# Patient Record
Sex: FEMALE | Race: WHITE | Employment: UNEMPLOYED | URBAN - METROPOLITAN AREA
[De-identification: names, ages, dates, MRNs, and addresses within clinical notes are randomized per-mention and may not be internally consistent; named-entity substitution may affect disease eponyms.]

---

## 2016-04-07 LAB
ALBUMIN SERPL-MCNC: 4.5 G/DL
ALP BLD-CCNC: 63 U/L
ALT SERPL-CCNC: 17 U/L
ANION GAP SERPL CALCULATED.3IONS-SCNC: NORMAL MMOL/L
AST SERPL-CCNC: 16 U/L
AVERAGE GLUCOSE: NORMAL
BASOPHILS ABSOLUTE: 0.1 /ΜL
BASOPHILS RELATIVE PERCENT: 1.2 %
BILIRUB SERPL-MCNC: 0.3 MG/DL (ref 0.1–1.4)
BUN BLDV-MCNC: 12 MG/DL
CALCIUM SERPL-MCNC: 9.1 MG/DL
CHLORIDE BLD-SCNC: 105 MMOL/L
CHOLESTEROL, TOTAL: 191 MG/DL
CHOLESTEROL/HDL RATIO: NORMAL
CO2: 25 MMOL/L
CREAT SERPL-MCNC: 0.5 MG/DL
EOSINOPHILS ABSOLUTE: 0.1 /ΜL
EOSINOPHILS RELATIVE PERCENT: 1.8 %
GFR CALCULATED: NORMAL
GLUCOSE BLD-MCNC: 113 MG/DL
HBA1C MFR BLD: 5.9 %
HCT VFR BLD CALC: 43.5 % (ref 36–46)
HDLC SERPL-MCNC: 67 MG/DL (ref 35–70)
HEMOGLOBIN: 14.5 G/DL (ref 12–16)
LDL CHOLESTEROL CALCULATED: 106 MG/DL (ref 0–160)
LYMPHOCYTES ABSOLUTE: 1.6 /ΜL
LYMPHOCYTES RELATIVE PERCENT: 22.9 %
MCH RBC QN AUTO: 30.1 PG
MCHC RBC AUTO-ENTMCNC: 33.4 G/DL
MCV RBC AUTO: 90.2 FL
MONOCYTES ABSOLUTE: 0.6 /ΜL
MONOCYTES RELATIVE PERCENT: 8.5 %
NEUTROPHILS ABSOLUTE: 4.6 /ΜL
NEUTROPHILS RELATIVE PERCENT: 65.6 %
PDW BLD-RTO: 14.3 %
PLATELET # BLD: 223 K/ΜL
PMV BLD AUTO: NORMAL FL
POTASSIUM SERPL-SCNC: 4.4 MMOL/L
RBC # BLD: 4.82 10^6/ΜL
SODIUM BLD-SCNC: 149 MMOL/L
TOTAL PROTEIN: 6.6
TRIGL SERPL-MCNC: 90 MG/DL
TSH SERPL DL<=0.05 MIU/L-ACNC: 2.41 UIU/ML
VLDLC SERPL CALC-MCNC: 18 MG/DL
WBC # BLD: 7 10^3/ML

## 2017-09-28 ENCOUNTER — OFFICE VISIT (OUTPATIENT)
Dept: FAMILY MEDICINE CLINIC | Age: 62
End: 2017-09-28

## 2017-09-28 VITALS
HEIGHT: 69 IN | BODY MASS INDEX: 28.53 KG/M2 | SYSTOLIC BLOOD PRESSURE: 136 MMHG | WEIGHT: 192.6 LBS | HEART RATE: 68 BPM | OXYGEN SATURATION: 97 % | RESPIRATION RATE: 16 BRPM | DIASTOLIC BLOOD PRESSURE: 78 MMHG

## 2017-09-28 DIAGNOSIS — F34.1 DYSTHYMIA: Primary | ICD-10-CM

## 2017-09-28 DIAGNOSIS — Z76.89 ENCOUNTER TO ESTABLISH CARE: ICD-10-CM

## 2017-09-28 DIAGNOSIS — Z11.4 SCREENING FOR HIV WITHOUT PRESENCE OF RISK FACTORS: ICD-10-CM

## 2017-09-28 DIAGNOSIS — Z13.1 ENCOUNTER FOR SCREENING FOR DIABETES MELLITUS: ICD-10-CM

## 2017-09-28 DIAGNOSIS — Z23 NEED FOR PROPHYLACTIC VACCINATION AGAINST STREPTOCOCCUS PNEUMONIAE (PNEUMOCOCCUS): ICD-10-CM

## 2017-09-28 DIAGNOSIS — Z13.220 SCREENING FOR HYPERLIPIDEMIA: ICD-10-CM

## 2017-09-28 DIAGNOSIS — Z11.59 NEED FOR HEPATITIS C SCREENING TEST: ICD-10-CM

## 2017-09-28 DIAGNOSIS — M79.645 PAIN OF FINGER OF LEFT HAND: ICD-10-CM

## 2017-09-28 DIAGNOSIS — F17.210 CIGARETTE NICOTINE DEPENDENCE WITHOUT COMPLICATION: ICD-10-CM

## 2017-09-28 DIAGNOSIS — E78.2 MIXED HYPERLIPIDEMIA: ICD-10-CM

## 2017-09-28 DIAGNOSIS — E66.09 NON MORBID OBESITY DUE TO EXCESS CALORIES: ICD-10-CM

## 2017-09-28 DIAGNOSIS — R73.01 ABNORMAL FASTING GLUCOSE: ICD-10-CM

## 2017-09-28 PROCEDURE — 99203 OFFICE O/P NEW LOW 30 MIN: CPT | Performed by: FAMILY MEDICINE

## 2017-09-28 PROCEDURE — 90471 IMMUNIZATION ADMIN: CPT | Performed by: FAMILY MEDICINE

## 2017-09-28 PROCEDURE — 90670 PCV13 VACCINE IM: CPT | Performed by: FAMILY MEDICINE

## 2017-09-28 RX ORDER — VENLAFAXINE 37.5 MG/1
37.5 TABLET ORAL DAILY
Qty: 30 TABLET | Refills: 5 | Status: SHIPPED | OUTPATIENT
Start: 2017-09-28 | End: 2017-10-12 | Stop reason: SDUPTHER

## 2017-09-28 RX ORDER — VENLAFAXINE 37.5 MG/1
37.5 TABLET ORAL DAILY
COMMUNITY
End: 2017-09-28

## 2017-09-28 ASSESSMENT — ENCOUNTER SYMPTOMS
NAUSEA: 0
EYE PAIN: 0
VOICE CHANGE: 0
EYE ITCHING: 0
BACK PAIN: 1
VOMITING: 0
COUGH: 0
DIARRHEA: 0
CONSTIPATION: 0
SHORTNESS OF BREATH: 0
ABDOMINAL PAIN: 0
SORE THROAT: 0

## 2017-09-28 ASSESSMENT — PATIENT HEALTH QUESTIONNAIRE - PHQ9
SUM OF ALL RESPONSES TO PHQ QUESTIONS 1-9: 0
1. LITTLE INTEREST OR PLEASURE IN DOING THINGS: 0
SUM OF ALL RESPONSES TO PHQ9 QUESTIONS 1 & 2: 0
2. FEELING DOWN, DEPRESSED OR HOPELESS: 0

## 2017-09-28 NOTE — PROGRESS NOTES
reports that she drinks alcohol. Controlled Substances Monitoring: Attestation: The Prescription Monitoring Report was requested today but not available. Brielle Huynh MD)  Documentation: No signs of potential drug abuse or diversion identified. (No report matched the search criteria.) Brielle Huynh MD)    Patient Medical History:    Medications: On presentation AND new orders today :  Outpatient Prescriptions Marked as Taking for the 17 encounter (Office Visit) with Brielle Huynh MD   Medication Sig Dispense Refill    neomycin-polymyxin-hydrocortisone (CORTISPORIN) SUSP ophthalmic suspension       venlafaxine (EFFEXOR) 37.5 MG tablet Take 1 tablet by mouth daily 30 tablet 5    simvastatin (ZOCOR) 40 MG tablet Take 40 mg by mouth nightly. Past Medical History:   Diagnosis Date    Hyperlipidemia     Thyroid mass     pt is scheduled for biopsy 2013        Past Surgical History:   Procedure Laterality Date     SECTION  (with BPS)    DILATION AND CURETTAGE OF UTERUS      LAPAROSCOPY      x3- last one 18's        Family History   Problem Relation Age of Onset    Diabetes Mother     Cancer Mother      hx melanoma    Stroke Mother      \"had mini=stroke\"    High Blood Pressure Mother     Cancer Father      lung cancer    Asthma Sister        Social History     Social History    Marital status:      Spouse name: N/A    Number of children: N/A    Years of education: N/A     Occupational History    Not on file.      Social History Main Topics    Smoking status: Current Every Day Smoker     Packs/day: 1.00     Years: 40.00    Smokeless tobacco: Never Used    Alcohol use Yes      Comment: average one time per week    Drug use: No      Comment: \"years ago, 40 yrs ago used cocaine, heroin, LSD\"only tried these once\",marijuana as teenager    Sexual activity: Not on file     Other Topics Concern    Not on file     Social History Narrative    No Normocephalic and atraumatic. Right Ear: Tympanic membrane, external ear and ear canal normal.   Left Ear: Tympanic membrane, external ear and ear canal normal.   Mouth/Throat: Oropharynx is clear and moist and mucous membranes are normal.   Eyes: Conjunctivae and EOM are normal. Pupils are equal, round, and reactive to light. Neck: Neck supple. No spinous process tenderness present. Cardiovascular: Normal rate, regular rhythm, S1 normal, S2 normal and intact distal pulses. No murmur heard. Pulmonary/Chest: Effort normal and breath sounds normal. She has no wheezes. She has no rales. She exhibits no tenderness. Abdominal: Soft. Bowel sounds are normal. She exhibits no mass. There is no tenderness. There is no rebound, no guarding and no CVA tenderness. There is increased abdominal girth. Musculoskeletal: She exhibits no edema or deformity. The left second finger and fifth finger have slight swelling compared to the other side. No erythema. Lymphadenopathy:     She has no cervical adenopathy. Neurological: She is alert and oriented to person, place, and time. She has normal reflexes. No cranial nerve deficit. Skin: Skin is warm, dry and intact. No bruising, no ecchymosis and no rash noted. No erythema. Psychiatric: She has a normal mood and affect. Her speech is normal. Thought content normal.   Vitals reviewed. Today's Point of Care Results: No results found for this visit on 09/28/17. PHQ Scores 9/28/2017   PHQ2 Score 0   PHQ9 Score 0     Interpretation of Total Score Depression Severity: 1-4 = Minimal depression, 5-9 = Mild depression, 10-14 = Moderate depression, 15-19 = Moderately severe depression, 20-27 = Severe depression    Diagnosis, Assessment, Plan, and Associated Orders:     1. Dysthymia:Patient is stable on this dose of Effexor. We discussed the mechanism of action and decided it is helping her at this time.   I spent considerable time with her discussing the possible

## 2017-09-28 NOTE — Clinical Note
Please inform the patient that after I went through her chart from Dr. Yaima Finnegan, I think it would be more efficient for her to get her blood work done before her next visit in 6 weeks. Please schedule a fasting blood draw before that time.   Thank you NGOZI

## 2017-09-28 NOTE — MR AVS SNAPSHOT
After Visit Summary             Chinyere Ellis   2017 8:30 AM   Office Visit    Description:  Female : 1955   Provider:  Pamela Reardon MD   Department:  [place] [address] and Future Appointments         Below is a list of your follow-up and future appointments. This may not be a complete list as you may have made appointments directly with providers that we are not aware of or your providers may have made some for you. Please call your providers to confirm appointments. It is important to keep your appointments. Please bring your current insurance card, photo ID, co-pay, and all medication bottles to your appointment. If self-pay, payment is expected at the time of service. Your To-Do List     Future Appointments Provider Department Dept Phone    2017 9:30 AM Pamela Reardon MD [address] [place] [address] [phone]    Future Orders Complete By Expires    XR FINGER LEFT (MIN 2 VIEWS) [24139 Custom]  2017 (Approximate) 10/18/2018    Follow-Up    Return for recheck mood in 6 week. Information from Your Visit        Department     Name Address Phone Fax    [address] [place] [address] [address]  [address] [place] [address] [phone]      You Were Seen for:         Comments    Dysthymia   [380101]         Vital Signs     Blood Pressure Pulse Respirations Height Weight Oxygen Saturation    136/78 (Site: Right Arm, Position: Sitting, Cuff Size: Large Adult) 68 16 5' 8.5\" (1.74 m) 192 lb 9.6 oz (87.4 kg) 97%    Body Mass Index Smoking Status                28.86 kg/m2 Current Every Day Smoker          Additional Information about your Body Mass Index (BMI)           Your BMI as listed above is considered overweight (25.0-29.9). BMI is an estimate of body fat, calculated from your height and weight.   The higher your BMI, the greater your risk of heart disease, high blood pressure, type 2 diabetes, stroke, gallstones, arthritis, sleep apnea, and certain cancers. BMI is not perfect. It may overestimate body fat in athletes and people who are more muscular. If your body fat is high you can improve your BMI by decreasing your calorie intake and becoming more physically active. Learn more at: [employer]          Instructions    Find Ophthalmologist and let office know if you need a referral for you eye. Today's Medication Changes          These changes are accurate as of: 9/28/17  9:57 AM.  If you have any questions, ask your nurse or doctor. STOP taking these medications           aspirin 325 MG tablet   Stopped by:  Madison Lindsey MD       CALCIUM CITRATE PO   Stopped by:  Madison Lindsey MD       Echinacea 400 MG Caps   Stopped by:  Madison Lindsey MD       [address] by:  Madison Lindsey MD       magnesium gluconate 500 MG tablet   Commonly known as:  Zafar [place] by:  Madison Lindsey MD       naproxen 500 MG tablet   Commonly known as:  NAPROSYN   Stopped by:  Madison Lindsey MD       SUPER B COMPLEX PO   Stopped by:  Madison Lindsey MD       VITAMIN B 12 PO   Stopped by:  Madison Lindsey MD            Where to Get Your Medications      These medications were sent to [facility] [address] [facility], [address] [place] [address] [address] [place] [address] Street [phone]  [facility], [address] [place] [address]     Phone:  [phone]     venlafaxine 37.5 MG tablet               Your Current Medications Are              neomycin-polymyxin-hydrocortisone (CORTISPORIN) SUSP ophthalmic suspension     venlafaxine (EFFEXOR) 37.5 MG tablet Take 1 tablet by mouth daily    simvastatin (ZOCOR) 40 MG tablet Take 40 mg by mouth nightly.       Allergies              Morphine     'years ago, trouble getting blood pressure up after getting Morphine after surgery\"    Tussin [Guaifenesin] \"gave me a odd feeling, like Worthy Liya in [place]"      We Ordered/Performed the following           Pneumococcal conjugate vaccine 13-valent PCV13          Additional Information        Basic Information     Date Of Birth Sex Race Ethnicity Preferred Language    1955 Female White Non-/Non  English      Preventive Care        Date Due    Hepatitis C screening is recommended for all adults regardless of risk factors born between [facility] at least once (lifetime) who have never been tested. 1955    HIV screening is recommended for all people regardless of risk factors  aged 15-65 years at least once (lifetime) who have never been HIV tested. 12/27/1972    Tetanus Combination Vaccine (1 - Tdap) 12/27/1976    Pneumococcal Vaccine - Pneumovax for adults aged 19-64 years with: chronic heart disease, chronic lung disease, diabetes mellitus, alcoholism, chronic liver disease, or cigarette smoking. (1 of 1 - PPSV23) 12/27/1976    Pap Smear 12/27/1978    Cholesterol Screening 12/27/1997    Diabetes Screening 12/27/1997    Colonoscopy 12/27/2007    Mammograms are recommended every 2 years for low/average risk patients aged 48 - 69, and every year for high risk patients per updated national guidelines. However these guidelines can be individualized by your provider. 8/27/2015    Yearly Flu Vaccine (1) 9/1/2017            [employer] Signup           [employer] allows you to send messages to your doctor, view your test results, renew your prescriptions, schedule appointments, view visit notes, and more. How Do I Sign Up? 1. In your Internet browser, go to [email or web address]  2. Click on the Sign Up Now link in the Sign In box. You will see the New Member Sign Up page. 3. Enter your [employer] Access Code exactly as it appears below. You will not need to use this code after youve completed the sign-up process.  If

## 2017-10-11 ENCOUNTER — TELEPHONE (OUTPATIENT)
Dept: FAMILY MEDICINE CLINIC | Age: 62
End: 2017-10-11

## 2017-10-11 DIAGNOSIS — F34.1 DYSTHYMIA: Primary | ICD-10-CM

## 2017-10-12 RX ORDER — VENLAFAXINE HYDROCHLORIDE 37.5 MG/1
37.5 CAPSULE, EXTENDED RELEASE ORAL DAILY
Qty: 30 CAPSULE | Refills: 5 | Status: SHIPPED | OUTPATIENT
Start: 2017-10-12 | End: 2018-04-23 | Stop reason: SDUPTHER

## 2017-10-12 NOTE — TELEPHONE ENCOUNTER
Pt is asking if she needs to take the medication more than once a days madhu the tablets are not extended release please advise

## 2017-10-12 NOTE — TELEPHONE ENCOUNTER
Please inform the patient that I made the switch because of her insurance formulary. If there is a reason she cannot take the tablets, let me know, and I will order the capsules.  Thank you, NGOZI

## 2017-11-03 ENCOUNTER — NURSE ONLY (OUTPATIENT)
Dept: FAMILY MEDICINE CLINIC | Age: 62
End: 2017-11-03

## 2017-11-03 DIAGNOSIS — Z13.1 ENCOUNTER FOR SCREENING FOR DIABETES MELLITUS: ICD-10-CM

## 2017-11-03 DIAGNOSIS — M79.645 PAIN OF FINGER OF LEFT HAND: ICD-10-CM

## 2017-11-03 DIAGNOSIS — R73.01 ABNORMAL FASTING GLUCOSE: ICD-10-CM

## 2017-11-03 DIAGNOSIS — Z13.220 SCREENING FOR HYPERLIPIDEMIA: ICD-10-CM

## 2017-11-03 DIAGNOSIS — Z11.4 SCREENING FOR HIV WITHOUT PRESENCE OF RISK FACTORS: ICD-10-CM

## 2017-11-03 DIAGNOSIS — E66.09 NON MORBID OBESITY DUE TO EXCESS CALORIES: ICD-10-CM

## 2017-11-03 DIAGNOSIS — Z11.59 NEED FOR HEPATITIS C SCREENING TEST: ICD-10-CM

## 2017-11-03 LAB
C-REACTIVE PROTEIN: 0.8 MG/L (ref 0–5.1)
CHOLESTEROL, TOTAL: 215 MG/DL (ref 0–199)
HDLC SERPL-MCNC: 67 MG/DL (ref 40–60)
HEPATITIS C ANTIBODY INTERPRETATION: NORMAL
LDL CHOLESTEROL CALCULATED: 126 MG/DL
SEDIMENTATION RATE, ERYTHROCYTE: 7 MM/HR (ref 0–30)
TRIGL SERPL-MCNC: 108 MG/DL (ref 0–150)
TSH REFLEX: 3.57 UIU/ML (ref 0.27–4.2)
VLDLC SERPL CALC-MCNC: 22 MG/DL

## 2017-11-03 PROCEDURE — 36415 COLL VENOUS BLD VENIPUNCTURE: CPT | Performed by: FAMILY MEDICINE

## 2017-11-06 LAB — HIV-1 AND HIV-2 ANTIBODIES: NORMAL

## 2017-11-13 ENCOUNTER — OFFICE VISIT (OUTPATIENT)
Dept: FAMILY MEDICINE CLINIC | Age: 62
End: 2017-11-13

## 2017-11-13 VITALS — BODY MASS INDEX: 29.7 KG/M2 | DIASTOLIC BLOOD PRESSURE: 86 MMHG | SYSTOLIC BLOOD PRESSURE: 132 MMHG | WEIGHT: 198.2 LBS

## 2017-11-13 DIAGNOSIS — G89.29 CHRONIC RIGHT-SIDED LOW BACK PAIN WITH RIGHT-SIDED SCIATICA: ICD-10-CM

## 2017-11-13 DIAGNOSIS — F34.1 DYSTHYMIA: Primary | ICD-10-CM

## 2017-11-13 DIAGNOSIS — B02.9 HERPES ZOSTER WITHOUT COMPLICATION: ICD-10-CM

## 2017-11-13 DIAGNOSIS — M54.41 CHRONIC RIGHT-SIDED LOW BACK PAIN WITH RIGHT-SIDED SCIATICA: ICD-10-CM

## 2017-11-13 DIAGNOSIS — M72.2 PLANTAR FASCIITIS OF RIGHT FOOT: ICD-10-CM

## 2017-11-13 PROCEDURE — G8427 DOCREV CUR MEDS BY ELIG CLIN: HCPCS | Performed by: FAMILY MEDICINE

## 2017-11-13 PROCEDURE — 3014F SCREEN MAMMO DOC REV: CPT | Performed by: FAMILY MEDICINE

## 2017-11-13 PROCEDURE — 4004F PT TOBACCO SCREEN RCVD TLK: CPT | Performed by: FAMILY MEDICINE

## 2017-11-13 PROCEDURE — G8484 FLU IMMUNIZE NO ADMIN: HCPCS | Performed by: FAMILY MEDICINE

## 2017-11-13 PROCEDURE — G8419 CALC BMI OUT NRM PARAM NOF/U: HCPCS | Performed by: FAMILY MEDICINE

## 2017-11-13 PROCEDURE — 3017F COLORECTAL CA SCREEN DOC REV: CPT | Performed by: FAMILY MEDICINE

## 2017-11-13 PROCEDURE — 99214 OFFICE O/P EST MOD 30 MIN: CPT | Performed by: FAMILY MEDICINE

## 2017-11-13 RX ORDER — VALACYCLOVIR HYDROCHLORIDE 1 G/1
1000 TABLET, FILM COATED ORAL 3 TIMES DAILY
Qty: 21 TABLET | Refills: 1 | Status: SHIPPED | OUTPATIENT
Start: 2017-11-13 | End: 2017-11-20

## 2017-11-13 ASSESSMENT — ENCOUNTER SYMPTOMS
CONSTIPATION: 0
VOMITING: 0
ABDOMINAL PAIN: 0
EYE PAIN: 0
NAUSEA: 0
DIARRHEA: 0
SHORTNESS OF BREATH: 0
VOICE CHANGE: 0
EYE ITCHING: 0
BACK PAIN: 1
COUGH: 0
SORE THROAT: 0

## 2017-11-13 NOTE — PATIENT INSTRUCTIONS
Take Ibuprofen 200 3 tablets 3 times/day for 7-10 days. See if you find the book [employer] in [employer]. Patient Education        Plantar Fasciitis: Exercises  Your Care Instructions  Here are some examples of typical rehabilitation exercises for your condition. Start each exercise slowly. Ease off the exercise if you start to have pain. Your doctor or physical therapist will tell you when you can start these exercises and which ones will work best for you. How to do the exercises  Note: Each exercise should create a pulling feeling but should not cause pain. Towel stretch    1. Sit with your legs extended and knees straight. 2. Place a towel around your foot just under the toes. 3. Hold each end of the towel in each hand, with your hands above your knees. 4. Pull back with the towel so that your foot stretches toward you. 5. Hold the position for at least 15 to 30 seconds. 6. Repeat 2 to 4 times a session, up to 5 sessions a day. Calf stretch    Note: This exercise stretches the muscles at the back of the lower leg (the calf) and the Achilles tendon. Do this exercise 3 or 4 times a day, 5 days a week. 1. Stand facing a wall with your hands on the wall at about eye level. Put the leg you want to stretch about a step behind your other leg. 2. Keeping your back heel on the floor, bend your front knee until you feel a stretch in the back leg. 3. Hold the stretch for 15 to 30 seconds. Repeat 2 to 4 times. Plantar fascia and calf stretch    Note: Stretching the plantar fascia and calf muscles can increase flexibility and decrease heel pain. You can do this exercise several times each day and before and after activity. 1. Stand on a step as shown above. Be sure to hold on to the banister. 2. Slowly let your heels down over the edge of the step as you relax your calf muscles. You should feel a gentle stretch across the bottom of your foot and up the back of your leg to your knee.   3. Hold the stretch

## 2017-11-13 NOTE — PROGRESS NOTES
Linda Zhou  1955  63 y.o. Allergies   Allergen Reactions    Morphine      'years ago, trouble getting blood pressure up after getting Morphine after surgery\"    Tussin [Guaifenesin]      \"gave me a odd feeling, like Carol Butler in [place]"       Chief Complaint   Patient presents with    Depression     still there, back hurting is causing this and no motivation. Sleep often     History of Present Illness  Dr Royce Singh:   Mrs. Ester Stark is a 63 y.o. White woman with Hyperlipidemia, Hypothyroidism, depression, and other medical problems who presents todayTo recheck depression. Depression: The patient states that she is continuing to have lack of motivation and depression. She spoke at length about the situation with her . 2010 started  Effexor for depression. She has tried to go off in the past, but got short with her family and decided to go back on. The patient states that her  is somewhat of a perfectionist and she feels like she is always being critiqued for everything she does. Back pain: The patient states she's had back pain with right sciatica on and off for years. She is seen chiropractors for this in the past.  She also states that her right heel is hurting for more than 3 weeks. It is worsened when she gets up and changing her shoes has not helped her. She takes ibuprofen 3-4 pills once or twice a day for her pain. Rash: The patient has an area on her back which occasionally gets a mildly painful blistering rash. This started again in the exact same place today. Hand pain: Somewhat better, ESR and CRP negative.  in [address] [place] [address] (She had been  previously.)  The patient is a homemaker. Two children: sophomore in [place] [address], and one at [employer]. 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  works as a . The patient  reports that she has been smoking. She has a 40.00 pack-year smoking history. She has never used smokeless tobacco.. change (can't lose weight). Negative for chills, fatigue and fever. 189-190 as low as weight goes   HENT: Negative for congestion, hearing loss, postnasal drip, sore throat, tinnitus and voice change. Eyes: Positive for visual disturbance (glasses/contacts). Negative for pain and itching. Respiratory: Negative for cough and shortness of breath. Cardiovascular: Positive for chest pain (atypical, seen in ED, never heart issue). Negative for palpitations and leg swelling. Gastrointestinal: Negative for abdominal pain, constipation, diarrhea, nausea and vomiting. Endocrine: Negative for cold intolerance, heat intolerance, polydipsia and polyuria. Genitourinary: Positive for dyspareunia ( No intercourse for several years secondary to the patient's uterine prolapse. She states that her gynecologist will not do surgery until she loses some weight.). Negative for dysuria, frequency and urgency. LMP: 2003   Musculoskeletal: Positive for arthralgias (left 2nd finger, pinky, hip pain when she has LBP ) and back pain (arthritis). Skin: Negative for rash. Neurological: Negative for dizziness, seizures, syncope, weakness, numbness and headaches. Hematological: Does not bruise/bleed easily. Psychiatric/Behavioral: Negative for dysphoric mood, sleep disturbance and suicidal ideas. Physical Exam   Nursing note reviewed  /86 (Site: Right Arm, Position: Sitting, Cuff Size: Large Adult)   Wt 198 lb 3.2 oz (89.9 kg)   BMI 29.70 kg/m²   BP Readings from Last 3 Encounters:   11/13/17 132/86   09/28/17 136/78   10/24/15 141/90     Wt Readings from Last 3 Encounters:   11/13/17 198 lb 3.2 oz (89.9 kg)   09/28/17 192 lb 9.6 oz (87.4 kg)   10/24/15 182 lb (82.6 kg)     Body mass index is 29.7 kg/m². Physical Exam   Constitutional: She is oriented to person, place, and time. She appears well-developed and well-nourished. Mrs. Ester Stark is a pleasant woman who is very easy to talk to.   She is

## 2017-11-30 ENCOUNTER — OFFICE VISIT (OUTPATIENT)
Dept: FAMILY MEDICINE CLINIC | Age: 62
End: 2017-11-30

## 2017-11-30 VITALS
OXYGEN SATURATION: 98 % | DIASTOLIC BLOOD PRESSURE: 76 MMHG | WEIGHT: 201 LBS | BODY MASS INDEX: 30.12 KG/M2 | HEART RATE: 69 BPM | SYSTOLIC BLOOD PRESSURE: 130 MMHG

## 2017-11-30 DIAGNOSIS — F17.210 CIGARETTE NICOTINE DEPENDENCE WITHOUT COMPLICATION: ICD-10-CM

## 2017-11-30 DIAGNOSIS — G89.29 CHRONIC RIGHT-SIDED LOW BACK PAIN WITH RIGHT-SIDED SCIATICA: ICD-10-CM

## 2017-11-30 DIAGNOSIS — E66.09 NON MORBID OBESITY DUE TO EXCESS CALORIES: ICD-10-CM

## 2017-11-30 DIAGNOSIS — M54.41 CHRONIC RIGHT-SIDED LOW BACK PAIN WITH RIGHT-SIDED SCIATICA: ICD-10-CM

## 2017-11-30 DIAGNOSIS — Z13.31 POSITIVE DEPRESSION SCREENING: ICD-10-CM

## 2017-11-30 DIAGNOSIS — F34.1 DYSTHYMIA: Primary | ICD-10-CM

## 2017-11-30 PROBLEM — B02.9 HERPES ZOSTER WITHOUT COMPLICATION: Status: ACTIVE | Noted: 2017-11-30

## 2017-11-30 PROCEDURE — G8484 FLU IMMUNIZE NO ADMIN: HCPCS | Performed by: FAMILY MEDICINE

## 2017-11-30 PROCEDURE — 3014F SCREEN MAMMO DOC REV: CPT | Performed by: FAMILY MEDICINE

## 2017-11-30 PROCEDURE — 4004F PT TOBACCO SCREEN RCVD TLK: CPT | Performed by: FAMILY MEDICINE

## 2017-11-30 PROCEDURE — G0444 DEPRESSION SCREEN ANNUAL: HCPCS | Performed by: FAMILY MEDICINE

## 2017-11-30 PROCEDURE — 3017F COLORECTAL CA SCREEN DOC REV: CPT | Performed by: FAMILY MEDICINE

## 2017-11-30 PROCEDURE — G8417 CALC BMI ABV UP PARAM F/U: HCPCS | Performed by: FAMILY MEDICINE

## 2017-11-30 PROCEDURE — G8431 POS CLIN DEPRES SCRN F/U DOC: HCPCS | Performed by: FAMILY MEDICINE

## 2017-11-30 PROCEDURE — G8427 DOCREV CUR MEDS BY ELIG CLIN: HCPCS | Performed by: FAMILY MEDICINE

## 2017-11-30 PROCEDURE — 99214 OFFICE O/P EST MOD 30 MIN: CPT | Performed by: FAMILY MEDICINE

## 2017-11-30 RX ORDER — TIZANIDINE 2 MG/1
TABLET ORAL
Qty: 30 TABLET | Refills: 3 | Status: SHIPPED | OUTPATIENT
Start: 2017-11-30 | End: 2019-03-07 | Stop reason: SDUPTHER

## 2017-11-30 ASSESSMENT — PATIENT HEALTH QUESTIONNAIRE - PHQ9
1. LITTLE INTEREST OR PLEASURE IN DOING THINGS: 1
8. MOVING OR SPEAKING SO SLOWLY THAT OTHER PEOPLE COULD HAVE NOTICED. OR THE OPPOSITE, BEING SO FIGETY OR RESTLESS THAT YOU HAVE BEEN MOVING AROUND A LOT MORE THAN USUAL: 0
6. FEELING BAD ABOUT YOURSELF - OR THAT YOU ARE A FAILURE OR HAVE LET YOURSELF OR YOUR FAMILY DOWN: 1
7. TROUBLE CONCENTRATING ON THINGS, SUCH AS READING THE NEWSPAPER OR WATCHING TELEVISION: 0
SUM OF ALL RESPONSES TO PHQ9 QUESTIONS 1 & 2: 2
10. IF YOU CHECKED OFF ANY PROBLEMS, HOW DIFFICULT HAVE THESE PROBLEMS MADE IT FOR YOU TO DO YOUR WORK, TAKE CARE OF THINGS AT HOME, OR GET ALONG WITH OTHER PEOPLE: 1
SUM OF ALL RESPONSES TO PHQ QUESTIONS 1-9: 6
9. THOUGHTS THAT YOU WOULD BE BETTER OFF DEAD, OR OF HURTING YOURSELF: 0
2. FEELING DOWN, DEPRESSED OR HOPELESS: 1
4. FEELING TIRED OR HAVING LITTLE ENERGY: 1
3. TROUBLE FALLING OR STAYING ASLEEP: 1
5. POOR APPETITE OR OVEREATING: 1

## 2017-11-30 ASSESSMENT — ENCOUNTER SYMPTOMS
SORE THROAT: 0
CONSTIPATION: 0
VOICE CHANGE: 0
EYE PAIN: 0
VOMITING: 0
ABDOMINAL PAIN: 0
EYE ITCHING: 0
COUGH: 0
DIARRHEA: 0
BACK PAIN: 1
NAUSEA: 0
SHORTNESS OF BREATH: 0

## 2017-11-30 NOTE — PROGRESS NOTES
smokeless tobacco..  The patient also  reports that she drinks alcohol. Controlled Substances Monitoring:      Patient Medical History:    Medications: On presentation AND new orders today :  Outpatient Prescriptions Marked as Taking for the 17 encounter (Office Visit) with Mallory Solis MD   Medication Sig Dispense Refill    tiZANidine (ZANAFLEX) 2 MG tablet Take 1/4, 1/2, or 1 tablet by mouth before sleep as needed for muscle relaxation or sleep. 30 tablet 3       Past Medical History:   Diagnosis Date    Hyperlipidemia     Thyroid mass     pt is scheduled for biopsy 2013        Past Surgical History:   Procedure Laterality Date     SECTION  (with BPS)    DILATION AND CURETTAGE OF UTERUS      LAPAROSCOPY      x3- last one 18's        Family History   Problem Relation Age of Onset    Diabetes Mother     Cancer Mother      hx melanoma    Stroke Mother      \"had mini=stroke\"    High Blood Pressure Mother     Cancer Father      lung cancer    Asthma Sister        Social History     Social History    Marital status:      Spouse name: N/A    Number of children: N/A    Years of education: N/A     Occupational History    Not on file. Social History Main Topics    Smoking status: Current Every Day Smoker     Packs/day: 1.00     Years: 40.00    Smokeless tobacco: Never Used    Alcohol use Yes      Comment: average one time per week    Drug use: No      Comment: \"years ago, 40 yrs ago used cocaine, heroin, LSD\"only tried these once\",marijuana as teenager    Sexual activity: Yes      Comment:       Other Topics Concern    Not on file     Social History Narrative    No narrative on file       Review of Systems   Constitutional: Positive for unexpected weight change (can't lose weight). Negative for chills, fatigue and fever.         189-190 as low as weight goes   HENT: Negative for congestion, hearing loss, postnasal drip, sore throat, tinnitus and voice change. Eyes: Positive for visual disturbance (glasses/contacts). Negative for pain and itching. Respiratory: Negative for cough and shortness of breath. Cardiovascular: Positive for chest pain (atypical, seen in ED, never heart issue). Negative for palpitations and leg swelling. Gastrointestinal: Negative for abdominal pain, constipation, diarrhea, nausea and vomiting. Endocrine: Negative for cold intolerance, heat intolerance, polydipsia and polyuria. Genitourinary: Positive for dyspareunia ( No intercourse for several years secondary to the patient's uterine prolapse. She states that her gynecologist will not do surgery until she loses some weight.). Negative for dysuria, frequency and urgency. LMP: 2003   Musculoskeletal: Positive for arthralgias (left 2nd finger, pinky, hip pain when she has LBP ) and back pain (arthritis). Skin: Negative for rash. Neurological: Negative for dizziness, seizures, syncope, weakness, numbness and headaches. Hematological: Does not bruise/bleed easily. Psychiatric/Behavioral: Positive for sleep disturbance (never feels rested, not morning person. ). Negative for dysphoric mood and suicidal ideas. Physical Exam   Nursing note reviewed  /76 (Site: Right Arm, Position: Sitting, Cuff Size: Large Adult)   Pulse 69   Wt 201 lb (91.2 kg)   SpO2 98%   BMI 30.12 kg/m²   BP Readings from Last 3 Encounters:   11/30/17 130/76   11/13/17 132/86   09/28/17 136/78     Wt Readings from Last 3 Encounters:   11/30/17 201 lb (91.2 kg)   11/13/17 198 lb 3.2 oz (89.9 kg)   09/28/17 192 lb 9.6 oz (87.4 kg)     Body mass index is 30.12 kg/m². Physical Exam   Constitutional: She is oriented to person, place, and time. She appears well-developed and well-nourished. Mrs. Nelida Cervantes is a pleasant woman who is very easy to talk to. She is overweight. She seems to be in a better mood than she was on previous occasions.    HENT:   Head: Normocephalic and atraumatic. Mouth/Throat: Oropharynx is clear and moist.   Cardiovascular: Normal rate, regular rhythm and normal heart sounds. Pulmonary/Chest: Breath sounds normal.   Abdominal: Soft. Bowel sounds are normal.   Musculoskeletal: She exhibits tenderness (lower back). She exhibits no edema. Neurological: She is alert and oriented to person, place, and time. Skin: Skin is warm and dry. Psychiatric: She has a normal mood and affect. Thought content normal.   The patient was very honest about her feelings surrounding her marriage and relationship with her . Vitals reviewed. Labs reviewed with patient. Today's Point of Care Results: No results found for this visit on 11/30/17. PHQ Scores 11/30/2017 9/28/2017   PHQ2 Score 2 0   PHQ9 Score 6 0     Interpretation of Total Score Depression Severity: 1-4 = Minimal depression, 5-9 = Mild depression, 10-14 = Moderate depression, 15-19 = Moderately severe depression, 20-27 = Severe depression    Diagnosis, Assessment, Plan, and Associated Orders:     1. Dysthymia:The patient is doing better having read some literature and had some good talks with her family over the Thanksgiving weekend. She still does not sleep very well or feel rested in the mornings. 2. Chronic right-sided low back pain with right-sided sciatica:I explained to the patient that the alpha 2 agonist Zanaflex can help her back pain and help her have more effective sleep. She verbalized understanding and willingness to try this medication. tiZANidine (ZANAFLEX) 2 MG tablet   3. Positive depression screening: On the basis of positive PHQ-9 screening (PHQ-9 Total Score: 6), the following plan was implemented: medication prescribed: Effexor- 37.5- patient will call for any significant medication side effects or worsening symptoms of depression. Patient has been on this medication for a significant amount of time. Patient will follow-up in 5 week(s) with PCP.

## 2018-04-23 DIAGNOSIS — F34.1 DYSTHYMIA: ICD-10-CM

## 2018-04-23 RX ORDER — VENLAFAXINE HYDROCHLORIDE 37.5 MG/1
37.5 CAPSULE, EXTENDED RELEASE ORAL DAILY
Qty: 30 CAPSULE | Refills: 5 | Status: CANCELLED | OUTPATIENT
Start: 2018-04-23

## 2018-04-24 RX ORDER — VENLAFAXINE HYDROCHLORIDE 37.5 MG/1
37.5 CAPSULE, EXTENDED RELEASE ORAL DAILY
Qty: 30 CAPSULE | Refills: 5 | Status: SHIPPED | OUTPATIENT
Start: 2018-04-24 | End: 2018-11-29

## 2018-05-25 ENCOUNTER — TELEPHONE (OUTPATIENT)
Dept: FAMILY MEDICINE CLINIC | Age: 63
End: 2018-05-25

## 2018-07-19 ENCOUNTER — OFFICE VISIT (OUTPATIENT)
Dept: FAMILY MEDICINE CLINIC | Age: 63
End: 2018-07-19

## 2018-07-19 VITALS
RESPIRATION RATE: 16 BRPM | DIASTOLIC BLOOD PRESSURE: 68 MMHG | WEIGHT: 195.8 LBS | HEART RATE: 71 BPM | BODY MASS INDEX: 29 KG/M2 | HEIGHT: 69 IN | SYSTOLIC BLOOD PRESSURE: 116 MMHG | OXYGEN SATURATION: 97 %

## 2018-07-19 DIAGNOSIS — S53.124A POSTERIOR DISLOCATION OF RIGHT ULNOHUMERAL JOINT, INITIAL ENCOUNTER: ICD-10-CM

## 2018-07-19 DIAGNOSIS — E66.09 NON MORBID OBESITY DUE TO EXCESS CALORIES: ICD-10-CM

## 2018-07-19 DIAGNOSIS — E04.2 MULTIPLE THYROID NODULES: ICD-10-CM

## 2018-07-19 DIAGNOSIS — Z87.891 PERSONAL HISTORY OF TOBACCO USE: ICD-10-CM

## 2018-07-19 DIAGNOSIS — Z12.2 ENCOUNTER FOR SCREENING FOR CANCER OF RESPIRATORY ORGANS: ICD-10-CM

## 2018-07-19 DIAGNOSIS — F34.1 DYSTHYMIA: Primary | ICD-10-CM

## 2018-07-19 DIAGNOSIS — F17.210 CIGARETTE NICOTINE DEPENDENCE WITHOUT COMPLICATION: ICD-10-CM

## 2018-07-19 LAB — TSH REFLEX: 2.4 UIU/ML (ref 0.27–4.2)

## 2018-07-19 PROCEDURE — G8427 DOCREV CUR MEDS BY ELIG CLIN: HCPCS | Performed by: FAMILY MEDICINE

## 2018-07-19 PROCEDURE — 4004F PT TOBACCO SCREEN RCVD TLK: CPT | Performed by: FAMILY MEDICINE

## 2018-07-19 PROCEDURE — G8417 CALC BMI ABV UP PARAM F/U: HCPCS | Performed by: FAMILY MEDICINE

## 2018-07-19 PROCEDURE — 36415 COLL VENOUS BLD VENIPUNCTURE: CPT | Performed by: FAMILY MEDICINE

## 2018-07-19 PROCEDURE — 99214 OFFICE O/P EST MOD 30 MIN: CPT | Performed by: FAMILY MEDICINE

## 2018-07-19 PROCEDURE — 3017F COLORECTAL CA SCREEN DOC REV: CPT | Performed by: FAMILY MEDICINE

## 2018-07-19 PROCEDURE — G0296 VISIT TO DETERM LDCT ELIG: HCPCS | Performed by: FAMILY MEDICINE

## 2018-07-19 NOTE — PROGRESS NOTES
vacation. It was ok, but they couldn't do anything because of her dislocated arm. Due for Mammogram, Colonoscopy, Started to schedule some stuff last year, then stopped. The patient  reports that she has been smoking. She has a 40.00 pack-year smoking history. She has never used smokeless tobacco.. The patient also  reports that she drinks alcohol. Allergies   Allergen Reactions    Morphine      'years ago, trouble getting blood pressure up after getting Morphine after surgery\"    Tussin [Guaifenesin]      \"gave me a odd feeling, like Vida Mendes in [place]"       Medications: On presentation AND new orders today :  Outpatient Prescriptions Marked as Taking for the 7/19/18 encounter (Office Visit) with Maureen Leon MD   Medication Sig Dispense Refill    venlafaxine (EFFEXOR XR) 37.5 MG extended release capsule Take 1 capsule by mouth daily 30 capsule 5       Past Medical History:   Diagnosis Date    Hyperlipidemia     Thyroid mass     pt is scheduled for biopsy 7/31/2013        Review of Systems   Constitutional: Negative for chills and fever. HENT: Negative for congestion, sinus pain, sinus pressure and sore throat. Right ear has clogging see HPI. Respiratory: Negative for cough, shortness of breath and wheezing. Cardiovascular: Negative for chest pain, palpitations and leg swelling. Gastrointestinal: Negative for abdominal pain, constipation, diarrhea, nausea and vomiting. Genitourinary: Negative for dysuria, frequency and urgency. Musculoskeletal: Positive for back pain. Planter fasciitis and intermittent back pain. Has trouble sitting. Skin: Negative for rash. Neurological: Negative for dizziness, seizures and syncope. Psychiatric/Behavioral: Positive for dysphoric mood (controlled with medications). Negative for sleep disturbance. The patient is not nervous/anxious.          Objective:      /68 (Site: Left Arm, Position: Sitting, Cuff Size: Large Adult) Pulse 71   Resp 16    5' 8.5\" (1.74 m)   Wt 195 lb 12.8 oz (88.8 kg)   SpO2 97%   BMI 29.34 kg/m²    BP Readings from Last 3 Encounters:   07/19/18 116/68   05/24/18 (!) 99/57   11/30/17 130/76     Wt Readings from Last 3 Encounters:   07/19/18 195 lb 12.8 oz (88.8 kg)   11/30/17 201 lb (91.2 kg)   11/13/17 198 lb 3.2 oz (89.9 kg)     Body mass index is 29.34 kg/m². Physical Exam   Constitutional: She is oriented to person, place, and time. She appears well-developed and well-nourished. Patient is overweight. HENT:   Head: Normocephalic and atraumatic. Mouth/Throat: Oropharynx is clear and moist.   Cardiovascular: Normal rate, regular rhythm and normal heart sounds. No murmur heard. Pulmonary/Chest: Breath sounds normal. She has no wheezes. She has no rales. Abdominal: Soft. Bowel sounds are normal. There is no tenderness. There is no rebound. There is abdominal obesity. Musculoskeletal: She exhibits no edema. Tenderness: There is tenderness on the inner aspect of both thighs with palpation over the tendinous tissue near the knees. No erythema no edema of the feet. Patient is unable to completely extend her right elbow. She is not wearing a brace or bandage. Neurological: She is alert and oriented to person, place, and time. Skin: Skin is warm and dry. Psychiatric: She has a normal mood and affect. Thought content normal.   Vitals reviewed. TSH on 7/19/2018:  2.40 normal.    Today's Point of Care Results: No results found for this visit on 07/19/18. PHQ Scores 11/30/2017 9/28/2017   PHQ2 Score 2 0   PHQ9 Score 6 0     Interpretation of Total Score Depression Severity: 1-4 = Minimal depression, 5-9 = Mild depression, 10-14 = Moderate depression, 15-19 = Moderately severe depression, 20-27 = Severe depression       Assessment / Plan:       Diagnosis Orders   1. Dysthymia  TSH with Reflex   2. Posterior dislocation of right ulnohumeral joint, initial encounter     3.  Non

## 2018-07-27 PROBLEM — E04.2 MULTIPLE THYROID NODULES: Status: ACTIVE | Noted: 2018-07-27

## 2018-07-27 ASSESSMENT — ENCOUNTER SYMPTOMS
COUGH: 0
SINUS PAIN: 0
SINUS PRESSURE: 0
BACK PAIN: 1
SHORTNESS OF BREATH: 0
DIARRHEA: 0
WHEEZING: 0
CONSTIPATION: 0
VOMITING: 0
ABDOMINAL PAIN: 0
NAUSEA: 0
SORE THROAT: 0

## 2018-08-01 ENCOUNTER — TELEPHONE (OUTPATIENT)
Dept: FAMILY MEDICINE CLINIC | Age: 63
End: 2018-08-01

## 2018-08-21 ENCOUNTER — HOSPITAL ENCOUNTER (OUTPATIENT)
Dept: ULTRASOUND IMAGING | Age: 63
Discharge: HOME | End: 2018-08-21
Attending: FAMILY MEDICINE | Admitting: FAMILY MEDICINE

## 2018-08-21 ENCOUNTER — TELEPHONE (OUTPATIENT)
Dept: FAMILY MEDICINE CLINIC | Age: 63
End: 2018-08-21

## 2018-08-21 DIAGNOSIS — E04.1 THYROID NODULE: Primary | ICD-10-CM

## 2018-08-21 DIAGNOSIS — E04.2 NONTOXIC MULTINODULAR GOITER: ICD-10-CM

## 2018-08-21 DIAGNOSIS — Z12.2 ENCOUNTER FOR SCREENING FOR CANCER OF RESPIRATORY ORGANS: ICD-10-CM

## 2018-08-21 DIAGNOSIS — E04.2 MULTIPLE THYROID NODULES: ICD-10-CM

## 2018-08-21 DIAGNOSIS — Z12.31 VISIT FOR SCREENING MAMMOGRAM: ICD-10-CM

## 2018-08-21 NOTE — TELEPHONE ENCOUNTER
Please inform patient that her screening CT scan for lung cancer showed no lung nodules; however, it did show the nodule in her thyroid. She should have a repeat screening screening lung CT scan in one year. Her mammogram was negative for any problems. The ultrasound of the thyroid showed that the nodule on the left side has grown and should have fine-needle aspiration. The only difference is that it is bigger, there is no change in the way it looks. I have ordered the fine-needle aspiration to be done at the [facility]) as she has had before. Central Scheduling should call her to schedule the appointment.    Also, Please fax order to central scheduling, Thank you, NGOZI

## 2018-10-21 ENCOUNTER — PATIENT MESSAGE (OUTPATIENT)
Dept: FAMILY MEDICINE CLINIC | Age: 63
End: 2018-10-21

## 2018-10-21 DIAGNOSIS — F34.1 DYSTHYMIA: Primary | ICD-10-CM

## 2018-10-22 NOTE — TELEPHONE ENCOUNTER
From: Bebo Ratliff  To: Yessi Chery MD  Sent: 10/21/2018 3:45 PM EDT  Subject: Prescription Question    I will run out of my current prescription of Effexor XR in about a week. As per previous conversation with Dr. Magdalena Lala, could she please call in a new prescription ([facility]) of Pristiq generic desvenlafaxine succinate er 25 mg with a 90 day supply. I have made an appointment but cannot get in until late November. Thank you.  Moy Catching

## 2018-10-23 RX ORDER — DESVENLAFAXINE 25 MG/1
25 TABLET, EXTENDED RELEASE ORAL DAILY
Qty: 90 TABLET | Refills: 1 | Status: SHIPPED | OUTPATIENT
Start: 2018-10-23 | End: 2019-01-07 | Stop reason: SDUPTHER

## 2018-11-29 ENCOUNTER — OFFICE VISIT (OUTPATIENT)
Dept: FAMILY MEDICINE CLINIC | Age: 63
End: 2018-11-29
Payer: COMMERCIAL

## 2018-11-29 VITALS
HEIGHT: 69 IN | SYSTOLIC BLOOD PRESSURE: 124 MMHG | OXYGEN SATURATION: 93 % | BODY MASS INDEX: 26.69 KG/M2 | WEIGHT: 180.2 LBS | RESPIRATION RATE: 12 BRPM | DIASTOLIC BLOOD PRESSURE: 70 MMHG | HEART RATE: 69 BPM

## 2018-11-29 DIAGNOSIS — G44.219 EPISODIC TENSION-TYPE HEADACHE, NOT INTRACTABLE: ICD-10-CM

## 2018-11-29 DIAGNOSIS — Z78.0 MENOPAUSE: ICD-10-CM

## 2018-11-29 DIAGNOSIS — F34.1 DYSTHYMIA: Primary | ICD-10-CM

## 2018-11-29 DIAGNOSIS — R73.03 PREDIABETES: ICD-10-CM

## 2018-11-29 DIAGNOSIS — Z87.81 HISTORY OF ARM FRACTURE: ICD-10-CM

## 2018-11-29 DIAGNOSIS — E04.2 NONTOXIC MULTINODULAR GOITER: ICD-10-CM

## 2018-11-29 DIAGNOSIS — Z13.820 SCREENING FOR OSTEOPOROSIS: ICD-10-CM

## 2018-11-29 DIAGNOSIS — F17.210 CIGARETTE NICOTINE DEPENDENCE WITHOUT COMPLICATION: ICD-10-CM

## 2018-11-29 DIAGNOSIS — L82.1 SEBORRHEIC KERATOSIS: ICD-10-CM

## 2018-11-29 LAB — HBA1C MFR BLD: 5.8 %

## 2018-11-29 PROCEDURE — G8427 DOCREV CUR MEDS BY ELIG CLIN: HCPCS | Performed by: FAMILY MEDICINE

## 2018-11-29 PROCEDURE — G8417 CALC BMI ABV UP PARAM F/U: HCPCS | Performed by: FAMILY MEDICINE

## 2018-11-29 PROCEDURE — 99214 OFFICE O/P EST MOD 30 MIN: CPT | Performed by: FAMILY MEDICINE

## 2018-11-29 PROCEDURE — G8484 FLU IMMUNIZE NO ADMIN: HCPCS | Performed by: FAMILY MEDICINE

## 2018-11-29 PROCEDURE — 3017F COLORECTAL CA SCREEN DOC REV: CPT | Performed by: FAMILY MEDICINE

## 2018-11-29 PROCEDURE — 83036 HEMOGLOBIN GLYCOSYLATED A1C: CPT | Performed by: FAMILY MEDICINE

## 2018-11-29 PROCEDURE — 4004F PT TOBACCO SCREEN RCVD TLK: CPT | Performed by: FAMILY MEDICINE

## 2018-11-29 RX ORDER — MAGNESIUM 30 MG
30 TABLET ORAL DAILY
COMMUNITY

## 2018-11-29 NOTE — PROGRESS NOTES
heart sounds. Pulmonary/Chest: Breath sounds normal.   Abdominal: Soft. Bowel sounds are normal.   Musculoskeletal: Limbs intact X 4. Legs are free of edema. There is tenderness in the low back on the right. No swelling noted in the knees. Skin: Skin is warm and dry. There is a approximately 1 x 1 cm pigmented lesion on the right side of the patient's neck posteriorly. This has all the characteristics of a seborrheic keratosis with this stuck on appearance. I reassured the patient about the benign nature of this lesion. Psychiatric: Patient has a normal mood and affect. Thought content normal.   Vitals reviewed. Lab Results   Component Value Date     04/07/2016    K 4.4 04/07/2016     04/07/2016    CO2 25 04/07/2016    BUN 12 04/07/2016    CREATININE 0.5 04/07/2016    GLUCOSE 113 04/07/2016    CALCIUM 9.1 04/07/2016    PROT 6.7 07/16/2013    LABALBU 4.5 04/07/2016    BILITOT 0.3 04/07/2016    ALKPHOS 63 04/07/2016    AST 16 04/07/2016    ALT 17 04/07/2016    LABGLOM >60 07/16/2013    GFRAA >60 07/16/2013       No results for input(s): WBC, HGB, HCT, MCV, PLT in the last 720 hours. Lab Results   Component Value Date    CHOL 215 (H) 11/03/2017    CHOL 191 04/07/2016     Lab Results   Component Value Date    TRIG 108 11/03/2017    TRIG 90 04/07/2016     Lab Results   Component Value Date    HDL 67 (H) 11/03/2017    HDL 67 04/07/2016     Lab Results   Component Value Date    LDLCALC 126 (H) 11/03/2017    LDLCALC 106 04/07/2016     Lab Results   Component Value Date    LABVLDL 22 11/03/2017    VLDL 18 04/07/2016     No results found for: [facility]  Lab Results   Component Value Date    LABA1C 5.8 11/29/2018     No results found for: EAG  Results for Mario Grace (MRN [id number]) as of 12/9/2018 18:05   Ref.  Range 7/19/2018 12:13   TSH Latest Ref Range: 0.27 - 4.20 uIU/mL 2.40     Today's Point of Care Results:   Results for POC orders placed in visit on 11/29/18   POCT glycosylated hemoglobin (Hb A1C)   Result Value Ref Range    Hemoglobin A1C 5.8 %     PHQ Scores 11/30/2017 9/28/2017   PHQ2 Score 2 0   PHQ9 Score 6 0     Interpretation of Total Score Depression Severity: 1-4 = Minimal depression, 5-9 = Mild depression, 10-14 = Moderate depression, 15-19 = Moderately severe depression, 20-27 = Severedepression       Assessment / Plan:       Diagnosis Orders   1. Dysthymia     2. Nontoxic multinodular goiter     3. Seborrheic keratosis     4. Cigarette nicotine dependence without complication     5. History of arm fracture      6. Menopause       DEXA BONE DENSITY AXIAL SKELETON   7. Screening for osteoporosis       DEXA BONE DENSITY AXIAL SKELETON   8. Prediabetes  POCT glycosylated hemoglobin (Hb A1C)      Discussion/Plan:  Depression: This patient tends to take care of everybody else except herself. I encouraged her to: Take the muscle relaxant. Get more sleep. Try wearing retainer for jaw. And decrease her smoking. I spent more than 15 minutes of the more than 25 minute interview in counseling the patient about her various social issues. Goiter: The patient states that she was never called for the fine-needle aspiration of her thyroid nodule. We will check into this so that it can be done. Reassured patient about seborrheic keratosis. Prediabetes: Patient congratulated on her weight loss and continuing diet plan. Restrictions: none, patient not working outside the home at this time. Will get DEXA scan secondary to patient's fracture and menopausal state. Gwenith First received counseling on the following healthy behaviors: exercise and tobacco cessation   Discussed use, benefit, and side effects of prescribedmedications as prescribed above. Patient instructed to notify our office if side effects develop from the medications. Barriers to medication compliance addressed. All patient questions answered. Pt voiced understanding.      Return for recheck headache in one

## 2018-12-09 ENCOUNTER — TELEPHONE (OUTPATIENT)
Dept: FAMILY MEDICINE CLINIC | Age: 63
End: 2018-12-09

## 2018-12-09 PROBLEM — B02.9 HERPES ZOSTER WITHOUT COMPLICATION: Status: RESOLVED | Noted: 2017-11-30 | Resolved: 2018-12-09

## 2018-12-28 ENCOUNTER — HOSPITAL ENCOUNTER (OUTPATIENT)
Dept: WOMENS IMAGING | Age: 63
Discharge: HOME | End: 2018-12-28
Payer: COMMERCIAL

## 2018-12-28 DIAGNOSIS — Z87.81 HISTORY OF ARM FRACTURE: ICD-10-CM

## 2018-12-28 DIAGNOSIS — Z13.820 SCREENING FOR OSTEOPOROSIS: ICD-10-CM

## 2018-12-28 DIAGNOSIS — Z78.0 MENOPAUSE: ICD-10-CM

## 2018-12-28 PROCEDURE — 77080 DXA BONE DENSITY AXIAL: CPT

## 2019-01-02 ENCOUNTER — HOSPITAL ENCOUNTER (OUTPATIENT)
Dept: INTERVENTIONAL RADIOLOGY/VASCULAR | Age: 64
Discharge: HOME | End: 2019-01-02
Payer: COMMERCIAL

## 2019-01-02 VITALS
WEIGHT: 175 LBS | HEIGHT: 67 IN | OXYGEN SATURATION: 97 % | HEART RATE: 67 BPM | SYSTOLIC BLOOD PRESSURE: 164 MMHG | RESPIRATION RATE: 16 BRPM | BODY MASS INDEX: 27.47 KG/M2 | TEMPERATURE: 97.7 F | DIASTOLIC BLOOD PRESSURE: 84 MMHG

## 2019-01-02 DIAGNOSIS — E04.1 THYROID NODULE: ICD-10-CM

## 2019-01-02 LAB
APTT: 27.1 SECONDS (ref 21.2–33)
HCT VFR BLD CALC: 48.2 % (ref 37–47)
HEMOGLOBIN: 15.3 GM/DL (ref 12.5–16)
INR BLD: 1.04 INDEX
MCH RBC QN AUTO: 29.9 PG (ref 27–31)
MCHC RBC AUTO-ENTMCNC: 31.7 % (ref 32–36)
MCV RBC AUTO: 94.1 FL (ref 78–100)
PDW BLD-RTO: 14 % (ref 11.7–14.9)
PLATELET # BLD: 252 K/CU MM (ref 140–440)
PMV BLD AUTO: 9.6 FL (ref 7.5–11.1)
PROTHROMBIN TIME: 11.8 SECONDS (ref 9.12–12.5)
RBC # BLD: 5.12 M/CU MM (ref 4.2–5.4)
WBC # BLD: 7.9 K/CU MM (ref 4–10.5)

## 2019-01-02 PROCEDURE — 88305 TISSUE EXAM BY PATHOLOGIST: CPT

## 2019-01-02 PROCEDURE — 85730 THROMBOPLASTIN TIME PARTIAL: CPT

## 2019-01-02 PROCEDURE — 2709999900 HC NON-CHARGEABLE SUPPLY

## 2019-01-02 PROCEDURE — 85610 PROTHROMBIN TIME: CPT

## 2019-01-02 PROCEDURE — 60100 BIOPSY OF THYROID: CPT

## 2019-01-02 PROCEDURE — 7100000010 HC PHASE II RECOVERY - FIRST 15 MIN

## 2019-01-02 PROCEDURE — 76942 ECHO GUIDE FOR BIOPSY: CPT

## 2019-01-02 PROCEDURE — 85027 COMPLETE CBC AUTOMATED: CPT

## 2019-01-02 ASSESSMENT — PAIN - FUNCTIONAL ASSESSMENT: PAIN_FUNCTIONAL_ASSESSMENT: 0-10

## 2019-01-02 ASSESSMENT — PAIN SCALES - GENERAL: PAINLEVEL_OUTOF10: 1

## 2019-01-07 ENCOUNTER — OFFICE VISIT (OUTPATIENT)
Dept: FAMILY MEDICINE CLINIC | Age: 64
End: 2019-01-07
Payer: COMMERCIAL

## 2019-01-07 VITALS
OXYGEN SATURATION: 95 % | SYSTOLIC BLOOD PRESSURE: 126 MMHG | HEART RATE: 84 BPM | WEIGHT: 181.8 LBS | HEIGHT: 67 IN | RESPIRATION RATE: 14 BRPM | BODY MASS INDEX: 28.53 KG/M2 | DIASTOLIC BLOOD PRESSURE: 70 MMHG

## 2019-01-07 DIAGNOSIS — F51.02 ADJUSTMENT INSOMNIA: ICD-10-CM

## 2019-01-07 DIAGNOSIS — G44.219 EPISODIC TENSION-TYPE HEADACHE, NOT INTRACTABLE: ICD-10-CM

## 2019-01-07 DIAGNOSIS — F34.1 DYSTHYMIA: Primary | ICD-10-CM

## 2019-01-07 DIAGNOSIS — M85.89 OSTEOPENIA OF MULTIPLE SITES: ICD-10-CM

## 2019-01-07 DIAGNOSIS — E04.1 THYROID NODULE: ICD-10-CM

## 2019-01-07 DIAGNOSIS — F17.210 CIGARETTE NICOTINE DEPENDENCE WITHOUT COMPLICATION: ICD-10-CM

## 2019-01-07 DIAGNOSIS — E04.2 NONTOXIC MULTINODULAR GOITER: ICD-10-CM

## 2019-01-07 PROCEDURE — G8427 DOCREV CUR MEDS BY ELIG CLIN: HCPCS | Performed by: FAMILY MEDICINE

## 2019-01-07 PROCEDURE — 3017F COLORECTAL CA SCREEN DOC REV: CPT | Performed by: FAMILY MEDICINE

## 2019-01-07 PROCEDURE — G8484 FLU IMMUNIZE NO ADMIN: HCPCS | Performed by: FAMILY MEDICINE

## 2019-01-07 PROCEDURE — G8417 CALC BMI ABV UP PARAM F/U: HCPCS | Performed by: FAMILY MEDICINE

## 2019-01-07 PROCEDURE — 4004F PT TOBACCO SCREEN RCVD TLK: CPT | Performed by: FAMILY MEDICINE

## 2019-01-07 PROCEDURE — 99214 OFFICE O/P EST MOD 30 MIN: CPT | Performed by: FAMILY MEDICINE

## 2019-01-07 RX ORDER — DESVENLAFAXINE 50 MG/1
50 TABLET, EXTENDED RELEASE ORAL DAILY
Qty: 90 TABLET | Refills: 1 | Status: SHIPPED | OUTPATIENT
Start: 2019-01-07

## 2019-01-07 RX ORDER — TRAZODONE HYDROCHLORIDE 50 MG/1
25-50 TABLET ORAL NIGHTLY
Qty: 30 TABLET | Refills: 2 | Status: SHIPPED | OUTPATIENT
Start: 2019-01-07

## 2019-01-08 PROBLEM — F51.02 ADJUSTMENT INSOMNIA: Status: ACTIVE | Noted: 2019-01-08

## 2019-03-07 DIAGNOSIS — M54.41 CHRONIC RIGHT-SIDED LOW BACK PAIN WITH RIGHT-SIDED SCIATICA: ICD-10-CM

## 2019-03-07 DIAGNOSIS — G89.29 CHRONIC RIGHT-SIDED LOW BACK PAIN WITH RIGHT-SIDED SCIATICA: ICD-10-CM

## 2019-03-07 RX ORDER — TIZANIDINE 2 MG/1
TABLET ORAL
Qty: 30 TABLET | Refills: 1 | Status: SHIPPED | OUTPATIENT
Start: 2019-03-07

## 2019-03-11 ENCOUNTER — TELEPHONE (OUTPATIENT)
Dept: FAMILY MEDICINE CLINIC | Age: 64
End: 2019-03-11

## 2023-10-09 ENCOUNTER — OFFICE VISIT (OUTPATIENT)
Dept: SURGERY | Age: 68
End: 2023-10-09
Payer: COMMERCIAL

## 2023-10-09 VITALS
WEIGHT: 159.9 LBS | SYSTOLIC BLOOD PRESSURE: 140 MMHG | BODY MASS INDEX: 25.1 KG/M2 | HEIGHT: 67 IN | DIASTOLIC BLOOD PRESSURE: 92 MMHG | HEART RATE: 77 BPM | OXYGEN SATURATION: 97 %

## 2023-10-09 DIAGNOSIS — K64.8 INTERNAL AND EXTERNAL HEMORRHOIDS WITHOUT COMPLICATION: ICD-10-CM

## 2023-10-09 DIAGNOSIS — K64.4 INTERNAL AND EXTERNAL HEMORRHOIDS WITHOUT COMPLICATION: ICD-10-CM

## 2023-10-09 PROCEDURE — 99203 OFFICE O/P NEW LOW 30 MIN: CPT | Performed by: SURGERY

## 2023-10-09 ASSESSMENT — PATIENT HEALTH QUESTIONNAIRE - PHQ9
SUM OF ALL RESPONSES TO PHQ9 QUESTIONS 1 & 2: 0
SUM OF ALL RESPONSES TO PHQ QUESTIONS 1-9: 0
1. LITTLE INTEREST OR PLEASURE IN DOING THINGS: 0
SUM OF ALL RESPONSES TO PHQ QUESTIONS 1-9: 0
SUM OF ALL RESPONSES TO PHQ QUESTIONS 1-9: 0
2. FEELING DOWN, DEPRESSED OR HOPELESS: 0
SUM OF ALL RESPONSES TO PHQ QUESTIONS 1-9: 0

## 2023-10-09 NOTE — PROGRESS NOTES
GENERAL SURGERY NOTE - Outpatient History & Physical and [address] [facility]    PATIENT: Liv Méndez 1955, 74 y.o., female   Date: 10/9/2023    MRN: [id number]   Requesting Provider:  Ji Kline MD History Obtained From:  patient, electronic medical record     Reason for Evaluation & Chief Complaint:    Chief Complaint   Patient presents with    Surgical Consult     N/P hemorrhoids, rectal pain c-scope in media     HISTORY OF PRESENT ILLNESS:    Liv Méndez is a 74 y.o. female presenting with concern for hemorrhoids. She has had small external hemorrhoids for years which have not been bothersome, but more recently she noticed a newer area externally which is larger. She denies bleeding or itching. She has more trouble getting clean with the new area present, for about 2 months. She uses a peppermint soap if she is having any irritation, but hasn't been using any other treatments. Denies hx of hemorrhoid procedures or surgeries, no banding, no thrombosed hemorrhoids. Occasionally has constipation, usually resolves on its own. Denies FH of colorectal cancer. Workup Includes:   23 colonoscopy:      Past Medical History:    Past Medical History:   Diagnosis Date    Arthritis     Emphysema of lung ([address])     mild    Hyperlipidemia     Internal and external hemorrhoids without complication     Thyroid mass     pt is scheduled for biopsy 2013    Thyroid nodule     scheduled for bx 2019       Past Surgical History:    Past Surgical History:   Procedure Laterality Date     SECTION  (with BPS)    DILATION AND CURETTAGE OF UTERUS      LAPAROSCOPY      x3- last one        Current Medications:   Current Outpatient Medications   Medication Sig Dispense Refill    tiZANidine (ZANAFLEX) 2 MG tablet Take 1/4, 1/2, or 1 tablet by mouth before sleep as needed for muscle relaxation or sleep.  30 tablet 1    SUPER B COMPLEX/C PO Take by mouth

## 2023-10-10 PROBLEM — K64.4 INTERNAL AND EXTERNAL HEMORRHOIDS WITHOUT COMPLICATION: Status: ACTIVE | Noted: 2023-10-10

## 2023-10-10 PROBLEM — K64.8 INTERNAL AND EXTERNAL HEMORRHOIDS WITHOUT COMPLICATION: Status: ACTIVE | Noted: 2023-10-10

## 2023-10-10 ASSESSMENT — ENCOUNTER SYMPTOMS
EYE REDNESS: 0
BLOOD IN STOOL: 0
DIARRHEA: 0
SORE THROAT: 0
ANAL BLEEDING: 0
COLOR CHANGE: 0
CONSTIPATION: 1
SHORTNESS OF BREATH: 0
VOMITING: 0
ABDOMINAL DISTENTION: 0
CHEST TIGHTNESS: 0
NAUSEA: 0
ABDOMINAL PAIN: 0
EYE DISCHARGE: 0
RECTAL PAIN: 0